# Patient Record
Sex: MALE | Race: WHITE | NOT HISPANIC OR LATINO | URBAN - METROPOLITAN AREA
[De-identification: names, ages, dates, MRNs, and addresses within clinical notes are randomized per-mention and may not be internally consistent; named-entity substitution may affect disease eponyms.]

---

## 2024-06-25 ENCOUNTER — APPOINTMENT (OUTPATIENT)
Dept: PRIMARY CARE | Facility: CLINIC | Age: 34
End: 2024-06-25
Payer: COMMERCIAL

## 2024-07-15 ENCOUNTER — APPOINTMENT (OUTPATIENT)
Dept: PRIMARY CARE | Facility: CLINIC | Age: 34
End: 2024-07-15
Payer: COMMERCIAL

## 2024-07-31 ENCOUNTER — APPOINTMENT (OUTPATIENT)
Dept: PRIMARY CARE | Facility: CLINIC | Age: 34
End: 2024-07-31
Payer: COMMERCIAL

## 2024-07-31 ENCOUNTER — HOSPITAL ENCOUNTER (OUTPATIENT)
Dept: RADIOLOGY | Facility: CLINIC | Age: 34
Discharge: HOME | End: 2024-07-31
Payer: COMMERCIAL

## 2024-07-31 VITALS
OXYGEN SATURATION: 98 % | WEIGHT: 159.3 LBS | SYSTOLIC BLOOD PRESSURE: 140 MMHG | HEART RATE: 81 BPM | TEMPERATURE: 96.9 F | HEIGHT: 69 IN | BODY MASS INDEX: 23.6 KG/M2 | DIASTOLIC BLOOD PRESSURE: 82 MMHG | RESPIRATION RATE: 20 BRPM

## 2024-07-31 DIAGNOSIS — Z82.49 FAMILY HISTORY OF HEART DISEASE: ICD-10-CM

## 2024-07-31 DIAGNOSIS — Z00.00 ANNUAL PHYSICAL EXAM: ICD-10-CM

## 2024-07-31 DIAGNOSIS — Z87.891 HISTORY OF TOBACCO USE: ICD-10-CM

## 2024-07-31 DIAGNOSIS — Z11.3 SCREENING FOR STD (SEXUALLY TRANSMITTED DISEASE): Primary | ICD-10-CM

## 2024-07-31 DIAGNOSIS — K40.90 RIGHT INGUINAL HERNIA: ICD-10-CM

## 2024-07-31 DIAGNOSIS — Z11.59 NEED FOR HEPATITIS C SCREENING TEST: ICD-10-CM

## 2024-07-31 PROCEDURE — 71046 X-RAY EXAM CHEST 2 VIEWS: CPT | Performed by: RADIOLOGY

## 2024-07-31 PROCEDURE — 1036F TOBACCO NON-USER: CPT

## 2024-07-31 PROCEDURE — 71046 X-RAY EXAM CHEST 2 VIEWS: CPT

## 2024-07-31 PROCEDURE — 3008F BODY MASS INDEX DOCD: CPT

## 2024-07-31 PROCEDURE — 99204 OFFICE O/P NEW MOD 45 MIN: CPT

## 2024-07-31 PROCEDURE — 99395 PREV VISIT EST AGE 18-39: CPT

## 2024-07-31 SDOH — ECONOMIC STABILITY: FOOD INSECURITY: WITHIN THE PAST 12 MONTHS, THE FOOD YOU BOUGHT JUST DIDN'T LAST AND YOU DIDN'T HAVE MONEY TO GET MORE.: NEVER TRUE

## 2024-07-31 SDOH — ECONOMIC STABILITY: FOOD INSECURITY: WITHIN THE PAST 12 MONTHS, YOU WORRIED THAT YOUR FOOD WOULD RUN OUT BEFORE YOU GOT MONEY TO BUY MORE.: NEVER TRUE

## 2024-07-31 ASSESSMENT — PATIENT HEALTH QUESTIONNAIRE - PHQ9
1. LITTLE INTEREST OR PLEASURE IN DOING THINGS: NOT AT ALL
SUM OF ALL RESPONSES TO PHQ9 QUESTIONS 1 & 2: 0
2. FEELING DOWN, DEPRESSED OR HOPELESS: NOT AT ALL

## 2024-07-31 ASSESSMENT — PAIN SCALES - GENERAL: PAINLEVEL: 0-NO PAIN

## 2024-07-31 ASSESSMENT — ENCOUNTER SYMPTOMS
OCCASIONAL FEELINGS OF UNSTEADINESS: 0
CARDIOVASCULAR NEGATIVE: 1
ARTHRALGIAS: 1
RESPIRATORY NEGATIVE: 1
CONSTITUTIONAL NEGATIVE: 1
EYES NEGATIVE: 1
LOSS OF SENSATION IN FEET: 0
GASTROINTESTINAL NEGATIVE: 1
DEPRESSION: 0
ENDOCRINE NEGATIVE: 1

## 2024-07-31 ASSESSMENT — ANXIETY QUESTIONNAIRES
4. TROUBLE RELAXING: NOT AT ALL
2. NOT BEING ABLE TO STOP OR CONTROL WORRYING: NOT AT ALL
6. BECOMING EASILY ANNOYED OR IRRITABLE: NOT AT ALL
3. WORRYING TOO MUCH ABOUT DIFFERENT THINGS: NOT AT ALL
7. FEELING AFRAID AS IF SOMETHING AWFUL MIGHT HAPPEN: NOT AT ALL
1. FEELING NERVOUS, ANXIOUS, OR ON EDGE: NOT AT ALL
IF YOU CHECKED OFF ANY PROBLEMS ON THIS QUESTIONNAIRE, HOW DIFFICULT HAVE THESE PROBLEMS MADE IT FOR YOU TO DO YOUR WORK, TAKE CARE OF THINGS AT HOME, OR GET ALONG WITH OTHER PEOPLE: NOT DIFFICULT AT ALL
5. BEING SO RESTLESS THAT IT IS HARD TO SIT STILL: NOT AT ALL
GAD7 TOTAL SCORE: 0

## 2024-07-31 ASSESSMENT — LIFESTYLE VARIABLES
AUDIT-C TOTAL SCORE: 0
SKIP TO QUESTIONS 9-10: 1
HOW OFTEN DO YOU HAVE SIX OR MORE DRINKS ON ONE OCCASION: NEVER
HOW MANY STANDARD DRINKS CONTAINING ALCOHOL DO YOU HAVE ON A TYPICAL DAY: PATIENT DOES NOT DRINK
HOW OFTEN DO YOU HAVE A DRINK CONTAINING ALCOHOL: NEVER

## 2024-07-31 NOTE — PROGRESS NOTES
"Subjective   Patient ID: Med Mireles is a 34 y.o. male who presents for New Patient Visit (Headaches - since he was 6 - gets them everyday - OTC Tylenol, advil/Heart - mother has heart valve replacement and has heart issues - pt does feel like he has heart palpations, causing nausea , lightheadedness.  Stopped smoking and lightheadedness and paplipatations  has gotten better - 6 months - no cardiologist/Hernia - right side lower abdomen - causes some pain (3 on pain scale) - 1 to 2 months ).    HPI   Med presents to establish care and for an annual visit. He endorses that he was experiencing heart palpitations that caused him nausea and lightheadedness, but those episodes have stopped since he quit smoking tobacco 6 months ago. His mother does have a cardiac history and he would like referral to cardiology to become established. Chest x-ray also ordered.    He has a right inguinal hernia that he noticed about a month ago. It is not causing him severe pain, but he would like to see general surgery to monitor.    He endorses having left elbow pain- he does not recall any direct trauma to the area, but he did work as a  for 9 years and felt that it may be due to wear and tear on his body from his job. He would like to try a compression sleeve for now, he declined x-ray at this time.     Routine lab work ordered, follow up in one year.    Review of Systems   Constitutional: Negative.    HENT: Negative.     Eyes: Negative.    Respiratory: Negative.     Cardiovascular: Negative.    Gastrointestinal: Negative.    Endocrine: Negative.    Genitourinary: Negative.    Musculoskeletal:  Positive for arthralgias.       Objective   /82 (BP Location: Left arm, Patient Position: Sitting, BP Cuff Size: Adult)   Pulse 81   Temp 36.1 °C (96.9 °F)   Resp 20   Ht 1.753 m (5' 9\")   Wt 72.3 kg (159 lb 4.8 oz)   SpO2 98%   BMI 23.52 kg/m²     Physical Exam  HENT:      Right Ear: Tympanic membrane normal.      Left " Ear: Tympanic membrane normal.   Cardiovascular:      Rate and Rhythm: Normal rate and regular rhythm.      Pulses: Normal pulses.      Heart sounds: Normal heart sounds.   Pulmonary:      Effort: Pulmonary effort is normal.      Breath sounds: Normal breath sounds.   Musculoskeletal:         General: Tenderness present. Normal range of motion.      Left elbow: Tenderness present.      Comments: Tenderness at the elbow area when carrying heavy objects at work. ROM WNL   Skin:     General: Skin is warm and dry.      Capillary Refill: Capillary refill takes less than 2 seconds.   Neurological:      Mental Status: He is oriented to person, place, and time.   Psychiatric:         Mood and Affect: Mood normal.         Behavior: Behavior normal.         Thought Content: Thought content normal.         Judgment: Judgment normal.         Assessment/Plan   Problem List Items Addressed This Visit             ICD-10-CM    Annual physical exam Z00.00    Relevant Orders    CBC    Comprehensive Metabolic Panel    TSH with reflex to Free T4 if abnormal    Lipid Panel    Vitamin B6    Vitamin B12    Right inguinal hernia K40.90    Relevant Orders    Referral to General Surgery    History of tobacco use Z87.891    Relevant Orders    XR chest 2 views    Family history of heart disease Z82.49    Relevant Orders    Referral to Cardiology     Other Visit Diagnoses         Codes    Screening for STD (sexually transmitted disease)    -  Primary Z11.3    Relevant Orders    HIV 1/2 Antigen/Antibody Screen with Reflex to Confirmation    Need for hepatitis C screening test     Z11.59    Relevant Orders    Hepatitis C Antibody

## 2024-07-31 NOTE — PATIENT INSTRUCTIONS
Thank you for coming to see me today.  If you have any questions or concerns following our visit, please contact the office.  Phone: [phone]    Follow up with me in 1 year    1)  Get fasting labwork completed.  The lab is down the lua from our office, I will reach out with results

## 2024-08-07 ENCOUNTER — APPOINTMENT (OUTPATIENT)
Dept: SURGERY | Facility: CLINIC | Age: 34
End: 2024-08-07
Payer: COMMERCIAL

## 2024-08-07 VITALS
WEIGHT: 158.4 LBS | BODY MASS INDEX: 23.46 KG/M2 | OXYGEN SATURATION: 97 % | HEIGHT: 69 IN | HEART RATE: 71 BPM | SYSTOLIC BLOOD PRESSURE: 115 MMHG | DIASTOLIC BLOOD PRESSURE: 73 MMHG

## 2024-08-07 DIAGNOSIS — K40.90 INGUINAL HERNIA OF RIGHT SIDE WITHOUT OBSTRUCTION OR GANGRENE: Primary | ICD-10-CM

## 2024-08-07 PROCEDURE — 3008F BODY MASS INDEX DOCD: CPT | Performed by: SURGERY

## 2024-08-07 PROCEDURE — 99203 OFFICE O/P NEW LOW 30 MIN: CPT | Performed by: SURGERY

## 2024-08-07 PROCEDURE — 1036F TOBACCO NON-USER: CPT | Performed by: SURGERY

## 2024-08-07 ASSESSMENT — ENCOUNTER SYMPTOMS
WOUND: 0
MYALGIAS: 0
CONSTIPATION: 0
COUGH: 0
WEAKNESS: 0
SHORTNESS OF BREATH: 0
EYE PAIN: 0
POLYPHAGIA: 0
VOMITING: 0
DIARRHEA: 0
DYSURIA: 0
BRUISES/BLEEDS EASILY: 0
FEVER: 0
FLANK PAIN: 0
NAUSEA: 0
EYE REDNESS: 0
AGITATION: 0
CONFUSION: 0
HEMATURIA: 0
CHILLS: 0
SPEECH DIFFICULTY: 0
FATIGUE: 0
HEADACHES: 0
ARTHRALGIAS: 1
ABDOMINAL PAIN: 0
FREQUENCY: 0

## 2024-08-07 NOTE — PATIENT INSTRUCTIONS
1.  Activity as tolerated.  Avoid any activity that causes pain at your hernia site.  2.  If you do have increased pain at your hernia site, lie down and apply ice to the groin area.  You may also do gentle massage to the right groin area.  If your pain does not improve within 1 hour, either go to the emergency room or call Dr. Whitman's office.  [phone]  3.  When you are ready to schedule surgery for your hernia, please call Dr. Whitman's office.

## 2024-08-07 NOTE — PROGRESS NOTES
GENERAL SURGERY OFFICE NOTE    Patient: Med Mireles    Age: 34 y.o.   Gender: male    MRN: [id number]    PCP: ONELIA Young        SUBJECTIVE     Chief Complaint  New Patient Visit (Patient was referred by Tiffanie Mendoza CNP for right inguinal hernia. Patient states that he was doing some heavy lifting that caused it. )       ULISSES Jovel is a 34-year-old white male who I am seeing in consultation at the request of his primary care physician for evaluation of a right inguinal hernia.  He states he does a moderate amount of heavy lifting (80 pounds at a time) at work repetitively.  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  Although he does not remember a sudden onset of any pain, about 1 to 2 weeks afterwards, he noticed a small bump in his right groin area.  This has gradually increased in size over the subsequent weeks.  The bulge is intermittent.  It has gotten as large as a golf ball.  He denies any changes in bowel or bladder habits.  No obstructive symptoms.    ROS  Review of Systems   Constitutional:  Negative for chills, fatigue and fever.   HENT:  Negative for congestion, ear pain and hearing loss.    Eyes:  Negative for pain and redness.   Respiratory:  Negative for cough and shortness of breath.    Cardiovascular:  Negative for chest pain and leg swelling.   Gastrointestinal:  Negative for abdominal pain, constipation, diarrhea, nausea and vomiting.   Endocrine: Negative for polyphagia.   Genitourinary:  Negative for dysuria, flank pain, frequency and hematuria.   Musculoskeletal:  Positive for arthralgias. Negative for myalgias.   Skin:  Negative for rash and wound.   Allergic/Immunologic: Negative for immunocompromised state.   Neurological:  Negative for speech difficulty, weakness and headaches.   Hematological:  Does not bruise/bleed easily.   Psychiatric/Behavioral:  Negative for agitation and confusion.   "         HISTORY   No past medical history on file.     No past surgical history on file.     Family History   Problem Relation Name Age of Onset    Heart disease Mother          Allergies   Allergen Reactions    Aspirin Unknown        Social History     Tobacco Use   Smoking Status Former    Types: Cigarettes    Passive exposure: Past   Smokeless Tobacco Never        Social History     Substance and Sexual Activity   Alcohol Use Yes    Comment: rare        HOME MEDICATIONS  No current outpatient medications       OBJECTIVE   Last Recorded Vitals.  Blood pressure 115/73, pulse 71, height 1.753 m (5' 9\"), weight 71.8 kg (158 lb 6.4 oz), SpO2 97%.     PHYSICAL EXAM  Physical Exam   General: Well-developed, well-nourished and in no acute distress.  Thin body habitus.  Head: Normocephalic. Atraumatic.  Neck/thyroid: Neck is supple.   Eyes: Pupils equal round and reactive to light. Conjunctiva normal.  ENMT: No masses or deformity of external nose. External ears without masses.  Respiratory/Chest:  Normal respiratory effort.  Cardiovascular: Regular rate and rhythm.   Abdomen: Soft, nontender, nondistended. No hernias.   Rectal: Deferred  : Normal external genitalia.  With Valsalva, there is a small right inguinal hernia which is reducible and nontender.  No obvious hernia on the left.  Musculoskeletal: Joints and limbs are grossly normal. Normal gait. Normal range of motion of major joints.  Neuro: Oriented to person, place and time. No obvious neurological deficit. Motor strength grossly normal.  Psych: Normal mood and affect.    RESULTS   Labs  No results found for this or any previous visit (from the past 24 hour(s)).    Radiology Resutls  No results found.       ASSESSMENT / PLAN   Diagnoses and all orders for this visit:  Inguinal hernia of right side without obstruction or gangrene  -     Referral to General Surgery      Plan  1.  The benefits and risks of a laparoscopic right inguinal hernia repair with mesh " surgery have been discussed with the patient.  The nature of the procedure was reviewed with the patient which included the risk and benefits of having surgery.  Alternative treatment options were reviewed. The risk included, but not limited to, infection, bleeding, injury surrounding organs, possible need for open procedure, possible need for another procedure, hernia formation at incision sites, recurrent hernias in the inguinal area, allergic reaction to medication and death.  If a hernia is identified on the left at the time of surgery, laparoscopic repair of a left inguinal hernia will also be performed.  2.  Education material regarding inguinal hernias has been provided to the patient.  3.  Signs and symptoms of incarceration have been reviewed. If any of these symptoms develop, the patient was instructed to contact the office immediately.  4.  Since he does a lot of heavy lifting (80 pounds) repetitively at work, he will need a total of 4 weeks of light duty postoperatively before returning back to full duty to decrease his risk of recurrent hernia, as well as, reduce his risk of hernia formation at his incision sites.  5.  Before he schedule surgery, he noted that he is undergoing a cardiac workup and workup for his elbow symptoms.  When he is ready to schedule surgery, he will call the office back.      Elvi Whitman MD, FACS   [facility]  [address] [place] [address];   [address] [address]  [place]  [address] [phone]

## 2024-08-07 NOTE — LETTER
August 7, 2024     NOELIA Young  [address]  [facility], [facility] [address]  [facility] [address]    Patient: eMd Mireles   YOB: 1990   Date of Visit: 8/7/2024       Dear ONELIA Carpenter:    Thank you for referring Med Mireles to me for evaluation. Below are my notes for this consultation.  If you have questions, please do not hesitate to call me. I look forward to following your patient along with you.       Sincerely,     Elvi Whitman MD      CC: No Recipients  ______________________________________________________________________________________        GENERAL SURGERY OFFICE NOTE    Patient: Med Mireles    Age: 34 y.o.   Gender: male    MRN: [id number]    PCP: ONELIA Young        SUBJECTIVE     Chief Complaint  New Patient Visit (Patient was referred by Tiffanie Mendoza CNP for right inguinal hernia. Patient states that he was doing some heavy lifting that caused it. )       ULISSES Jovel is a 34-year-old white male who I am seeing in consultation at the request of his primary care physician for evaluation of a right inguinal hernia.  He states he does a moderate amount of heavy lifting (80 pounds at a time) at work repetitively.  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  Although he does not remember a sudden onset of any pain, about 1 to 2 weeks afterwards, he noticed a small bump in his right groin area.  This has gradually increased in size over the subsequent weeks.  The bulge is intermittent.  It has gotten as large as a golf ball.  He denies any changes in bowel or bladder habits.  No obstructive symptoms.    ROS  Review of Systems   Constitutional:  Negative for chills, fatigue and fever.   HENT:  Negative for congestion, ear pain and hearing loss.    Eyes:  Negative for pain and redness.   Respiratory:  Negative for cough and shortness of breath.   "  Cardiovascular:  Negative for chest pain and leg swelling.   Gastrointestinal:  Negative for abdominal pain, constipation, diarrhea, nausea and vomiting.   Endocrine: Negative for polyphagia.   Genitourinary:  Negative for dysuria, flank pain, frequency and hematuria.   Musculoskeletal:  Positive for arthralgias. Negative for myalgias.   Skin:  Negative for rash and wound.   Allergic/Immunologic: Negative for immunocompromised state.   Neurological:  Negative for speech difficulty, weakness and headaches.   Hematological:  Does not bruise/bleed easily.   Psychiatric/Behavioral:  Negative for agitation and confusion.           HISTORY   No past medical history on file.     No past surgical history on file.     Family History   Problem Relation Name Age of Onset   • Heart disease Mother          Allergies   Allergen Reactions   • Aspirin Unknown        Social History     Tobacco Use   Smoking Status Former   • Types: Cigarettes   • Passive exposure: Past   Smokeless Tobacco Never        Social History     Substance and Sexual Activity   Alcohol Use Yes    Comment: rare        HOME MEDICATIONS  No current outpatient medications       OBJECTIVE   Last Recorded Vitals.  Blood pressure 115/73, pulse 71, height 1.753 m (5' 9\"), weight 71.8 kg (158 lb 6.4 oz), SpO2 97%.     PHYSICAL EXAM  Physical Exam   General: Well-developed, well-nourished and in no acute distress.  Thin body habitus.  Head: Normocephalic. Atraumatic.  Neck/thyroid: Neck is supple.   Eyes: Pupils equal round and reactive to light. Conjunctiva normal.  ENMT: No masses or deformity of external nose. External ears without masses.  Respiratory/Chest:  Normal respiratory effort.  Cardiovascular: Regular rate and rhythm.   Abdomen: Soft, nontender, nondistended. No hernias.   Rectal: Deferred  : Normal external genitalia.  With Valsalva, there is a small right inguinal hernia which is reducible and nontender.  No obvious hernia on the " left.  Musculoskeletal: Joints and limbs are grossly normal. Normal gait. Normal range of motion of major joints.  Neuro: Oriented to person, place and time. No obvious neurological deficit. Motor strength grossly normal.  Psych: Normal mood and affect.    RESULTS   Labs  No results found for this or any previous visit (from the past 24 hour(s)).    Radiology Resutls  No results found.       ASSESSMENT / PLAN   Diagnoses and all orders for this visit:  Inguinal hernia of right side without obstruction or gangrene  -     Referral to General Surgery      Plan  1.  The benefits and risks of a laparoscopic right inguinal hernia repair with mesh surgery have been discussed with the patient.  The nature of the procedure was reviewed with the patient which included the risk and benefits of having surgery.  Alternative treatment options were reviewed. The risk included, but not limited to, infection, bleeding, injury surrounding organs, possible need for open procedure, possible need for another procedure, hernia formation at incision sites, recurrent hernias in the inguinal area, allergic reaction to medication and death.  If a hernia is identified on the left at the time of surgery, laparoscopic repair of a left inguinal hernia will also be performed.  2.  Education material regarding inguinal hernias has been provided to the patient.  3.  Signs and symptoms of incarceration have been reviewed. If any of these symptoms develop, the patient was instructed to contact the office immediately.  4.  Since he does a lot of heavy lifting (80 pounds) repetitively at work, he will need a total of 4 weeks of light duty postoperatively before returning back to full duty to decrease his risk of recurrent hernia, as well as, reduce his risk of hernia formation at his incision sites.  5.  Before he schedule surgery, he noted that he is undergoing a cardiac workup and workup for his elbow symptoms.  When he is ready to schedule surgery,  he will call the office back.      Elvi Whitman MD, FACS  [facility]  [address] [place] [address];   [address]  [place]  [address] [phone]

## 2024-08-14 ENCOUNTER — TELEPHONE (OUTPATIENT)
Dept: SURGERY | Facility: CLINIC | Age: 34
End: 2024-08-14
Payer: COMMERCIAL

## 2024-08-15 ENCOUNTER — HOSPITAL ENCOUNTER (OUTPATIENT)
Facility: HOSPITAL | Age: 34
Setting detail: OUTPATIENT SURGERY
End: 2024-08-15
Attending: SURGERY | Admitting: SURGERY
Payer: COMMERCIAL

## 2024-08-15 DIAGNOSIS — K40.90 INGUINAL HERNIA OF RIGHT SIDE WITHOUT OBSTRUCTION OR GANGRENE: Primary | ICD-10-CM

## 2024-09-10 ENCOUNTER — ANESTHESIA EVENT (OUTPATIENT)
Dept: OPERATING ROOM | Facility: HOSPITAL | Age: 34
End: 2024-09-10

## 2024-09-10 ENCOUNTER — CLINICAL SUPPORT (OUTPATIENT)
Dept: PREADMISSION TESTING | Facility: HOSPITAL | Age: 34
End: 2024-09-10
Payer: COMMERCIAL

## 2024-09-10 RX ORDER — FAMOTIDINE 10 MG/ML
20 INJECTION INTRAVENOUS ONCE
OUTPATIENT
Start: 2024-09-10 | End: 2024-09-10

## 2024-09-10 RX ORDER — ACETAMINOPHEN 325 MG/1
975 TABLET ORAL ONCE
OUTPATIENT
Start: 2024-09-12

## 2024-09-10 RX ORDER — HYDROMORPHONE HYDROCHLORIDE 0.2 MG/ML
0.2 INJECTION INTRAMUSCULAR; INTRAVENOUS; SUBCUTANEOUS EVERY 5 MIN PRN
OUTPATIENT
Start: 2024-09-10

## 2024-09-10 RX ORDER — SODIUM CHLORIDE 9 MG/ML
50 INJECTION, SOLUTION INTRAVENOUS CONTINUOUS
OUTPATIENT
Start: 2024-09-12

## 2024-09-10 RX ORDER — ONDANSETRON HYDROCHLORIDE 2 MG/ML
4 INJECTION, SOLUTION INTRAVENOUS ONCE
OUTPATIENT
Start: 2024-09-10 | End: 2024-09-10

## 2024-09-10 RX ORDER — ONDANSETRON HYDROCHLORIDE 2 MG/ML
4 INJECTION, SOLUTION INTRAVENOUS ONCE AS NEEDED
OUTPATIENT
Start: 2024-09-10

## 2024-09-10 RX ORDER — SODIUM CITRATE AND CITRIC ACID MONOHYDRATE 334; 500 MG/5ML; MG/5ML
30 SOLUTION ORAL ONCE
OUTPATIENT
Start: 2024-09-10 | End: 2024-09-10

## 2024-09-10 RX ORDER — ALBUTEROL SULFATE 0.83 MG/ML
2.5 SOLUTION RESPIRATORY (INHALATION) ONCE
OUTPATIENT
Start: 2024-09-10 | End: 2024-09-10

## 2024-09-10 RX ORDER — METOCLOPRAMIDE HYDROCHLORIDE 5 MG/ML
10 INJECTION INTRAMUSCULAR; INTRAVENOUS ONCE
OUTPATIENT
Start: 2024-09-10 | End: 2024-09-10

## 2024-09-10 NOTE — PREPROCEDURE INSTRUCTIONS
No outpatient medications have been marked as taking for the 9/10/24 encounter (Clinical Support) with [facility] PAT ROOM 02.                       NPO Instructions:    NOTHING TO EAT OR DRINK AFTER MIDNIGHT  HYDRATE WELL THE DAY BEFORE    Additional Instructions:     Wear  comfortable loose fitting clothing  Do not use moisturizers, creams, lotions or perfume  All jewelry and valuables should be left at home    HIBICLENS SHOWER MORNING OF and  night before, below neck line and away from private area  Do not shave your groin area   Call [phone] with any questions

## 2024-09-11 ENCOUNTER — TELEPHONE (OUTPATIENT)
Dept: SURGERY | Facility: CLINIC | Age: 34
End: 2024-09-11
Payer: COMMERCIAL

## 2024-09-11 DIAGNOSIS — R00.2 HEART PALPITATIONS: Primary | ICD-10-CM

## 2024-09-11 PROBLEM — G44.209 TENSION TYPE HEADACHE: Status: ACTIVE | Noted: 2022-03-10

## 2024-09-11 PROBLEM — K58.9 IRRITABLE BOWEL SYNDROME: Status: ACTIVE | Noted: 2022-03-10

## 2024-09-11 PROBLEM — G43.019 COMMON MIGRAINE WITH INTRACTABLE MIGRAINE: Status: ACTIVE | Noted: 2024-09-11

## 2024-09-11 NOTE — TELEPHONE ENCOUNTER
Patient is having surgery tomorrow, 09/12/24 laparoscopic right inguinal hernia repair with mesh and is concerned about current symptoms he has been having.     Patient state that he has a sore throat, a cough a that has slight mucus, no fever, and no other concerning symptoms. Patient states he is coughing up yellow and green mucus. Patient would like to make sure this does not interfere with surgery tomorrow. Please advise.

## 2024-09-12 ENCOUNTER — ANESTHESIA (OUTPATIENT)
Dept: OPERATING ROOM | Facility: HOSPITAL | Age: 34
End: 2024-09-12

## 2024-09-16 ENCOUNTER — TELEPHONE (OUTPATIENT)
Dept: CARDIOLOGY | Facility: HOSPITAL | Age: 34
End: 2024-09-16
Payer: COMMERCIAL

## 2024-09-17 ENCOUNTER — APPOINTMENT (OUTPATIENT)
Dept: CARDIOLOGY | Facility: CLINIC | Age: 34
End: 2024-09-17
Payer: COMMERCIAL

## 2024-09-17 ENCOUNTER — ANCILLARY PROCEDURE (OUTPATIENT)
Dept: CARDIOLOGY | Facility: HOSPITAL | Age: 34
End: 2024-09-17
Payer: COMMERCIAL

## 2024-09-17 ENCOUNTER — LAB (OUTPATIENT)
Dept: LAB | Facility: LAB | Age: 34
End: 2024-09-17
Payer: COMMERCIAL

## 2024-09-17 VITALS
WEIGHT: 160 LBS | BODY MASS INDEX: 23.7 KG/M2 | OXYGEN SATURATION: 100 % | HEART RATE: 90 BPM | DIASTOLIC BLOOD PRESSURE: 80 MMHG | SYSTOLIC BLOOD PRESSURE: 134 MMHG | HEIGHT: 69 IN

## 2024-09-17 DIAGNOSIS — Z82.49 FAMILY HISTORY OF HEART DISEASE: ICD-10-CM

## 2024-09-17 DIAGNOSIS — R00.2 HEART PALPITATIONS: ICD-10-CM

## 2024-09-17 DIAGNOSIS — Z01.818 PRE-OPERATIVE CLEARANCE: Primary | ICD-10-CM

## 2024-09-17 LAB
ANION GAP SERPL CALC-SCNC: 11 MMOL/L (ref 10–20)
BODY SURFACE AREA: 1.88 M2
BUN SERPL-MCNC: 14 MG/DL (ref 6–23)
CALCIUM SERPL-MCNC: 9.2 MG/DL (ref 8.6–10.3)
CHLORIDE SERPL-SCNC: 103 MMOL/L (ref 98–107)
CHOLEST SERPL-MCNC: 192 MG/DL (ref 0–199)
CHOLESTEROL/HDL RATIO: 2.9
CO2 SERPL-SCNC: 29 MMOL/L (ref 21–32)
CREAT SERPL-MCNC: 0.92 MG/DL (ref 0.5–1.3)
EGFRCR SERPLBLD CKD-EPI 2021: >90 ML/MIN/1.73M*2
ERYTHROCYTE [DISTWIDTH] IN BLOOD BY AUTOMATED COUNT: 12.8 % (ref 11.5–14.5)
GLUCOSE SERPL-MCNC: 90 MG/DL (ref 74–99)
HCT VFR BLD AUTO: 44.1 % (ref 41–52)
HDLC SERPL-MCNC: 67.2 MG/DL
HGB BLD-MCNC: 15.4 G/DL (ref 13.5–17.5)
LDLC SERPL CALC-MCNC: 105 MG/DL
MCH RBC QN AUTO: 30.9 PG (ref 26–34)
MCHC RBC AUTO-ENTMCNC: 34.9 G/DL (ref 32–36)
MCV RBC AUTO: 88 FL (ref 80–100)
NON HDL CHOLESTEROL: 125 MG/DL (ref 0–149)
NRBC BLD-RTO: 0 /100 WBCS (ref 0–0)
PLATELET # BLD AUTO: 276 X10*3/UL (ref 150–450)
POTASSIUM SERPL-SCNC: 4.8 MMOL/L (ref 3.5–5.3)
RBC # BLD AUTO: 4.99 X10*6/UL (ref 4.5–5.9)
SODIUM SERPL-SCNC: 138 MMOL/L (ref 136–145)
TRIGL SERPL-MCNC: 100 MG/DL (ref 0–149)
VLDL: 20 MG/DL (ref 0–40)
WBC # BLD AUTO: 8.3 X10*3/UL (ref 4.4–11.3)

## 2024-09-17 PROCEDURE — 3008F BODY MASS INDEX DOCD: CPT | Performed by: STUDENT IN AN ORGANIZED HEALTH CARE EDUCATION/TRAINING PROGRAM

## 2024-09-17 PROCEDURE — 80061 LIPID PANEL: CPT

## 2024-09-17 PROCEDURE — 36415 COLL VENOUS BLD VENIPUNCTURE: CPT

## 2024-09-17 PROCEDURE — 80048 BASIC METABOLIC PNL TOTAL CA: CPT

## 2024-09-17 PROCEDURE — 85027 COMPLETE CBC AUTOMATED: CPT

## 2024-09-17 PROCEDURE — 93242 EXT ECG>48HR<7D RECORDING: CPT

## 2024-09-17 PROCEDURE — 99204 OFFICE O/P NEW MOD 45 MIN: CPT | Performed by: STUDENT IN AN ORGANIZED HEALTH CARE EDUCATION/TRAINING PROGRAM

## 2024-09-17 PROCEDURE — 93000 ELECTROCARDIOGRAM COMPLETE: CPT | Performed by: STUDENT IN AN ORGANIZED HEALTH CARE EDUCATION/TRAINING PROGRAM

## 2024-09-17 PROCEDURE — 1036F TOBACCO NON-USER: CPT | Performed by: STUDENT IN AN ORGANIZED HEALTH CARE EDUCATION/TRAINING PROGRAM

## 2024-09-17 NOTE — PROGRESS NOTES
[facility] Clinic Note    Date: 09/17/24  Time: 4:33 PM    Subjective   Med Mireles is a 34 y.o. male who is coming to cardiology clinic to establish care.  Patient does not see a doctor regularly.  Patient is a lifetime smoker.  Patient is very active at baseline.  Patient was noted to have a hernia in the inguinal region and being planned for surgical evaluation.  During preoperative workup patient treatment some heaviness/palpitation in the chest and was recommended cardiology evaluation.  Patient denies any chest pain at baseline.  He is very active physically without limitation.  He used to smoke a lot but now have stopped smoking.  He reports that he feels palpitation, fluttering sensation with occasional lightheadedness and dizziness.  He denies any syncope.  He denies any chest pain or dyspnea at this time.          Review of Systems:  Otherwise, limited cardiovascular review of systems is negative.        Medical History:   He has a past medical history of Palpitations.  Surgical History:   History reviewed. No pertinent surgical history.PSHP@  Social History:   Social Determinants of Health with Concerns     Tobacco Use: Medium Risk (9/17/2024)    Patient History     Smoking Tobacco Use: Former     Smokeless Tobacco Use: Never     Passive Exposure: Past   Financial Resource Strain: Not on file   Transportation Needs: Not on file   Physical Activity: Not on file   Stress: Not on file   Social Connections: Not on file   Intimate Partner Violence: Not on file   Housing Stability: Not on file   Utilities: Not on file   Digital Equity: Not on file   Health Literacy: Not on file     Family History:   Family History   Problem Relation Name Age of Onset    Heart disease Mother        Allergies:  Aspirin    Outpatient Medications:  No current outpatient medications    Objective     Physical Exam  Vitals:    09/17/24 1253   BP: 134/80   BP Location: Right arm   Patient Position:  "Sitting   BP Cuff Size: Adult   Pulse: 90   SpO2: 100%   Weight: 72.6 kg (160 lb)   Height: 1.753 m (5' 9\")     Wt Readings from Last 3 Encounters:   09/17/24 72.6 kg (160 lb)   08/07/24 71.8 kg (158 lb 6.4 oz)   07/31/24 72.3 kg (159 lb 4.8 oz)       General: Alert and Oriented, No distress, cooperative  Head: Normocephalic without obvious abnormality, atraumatic  Eyes: Conjunctiva/corneas clear, EOM's grossly intact  Neck: Supple, trachea midline, No thyroid enlargement/tenderness/nodules; No JVD  Lungs: Clear to auscultation bilaterally, no wheezes, rhonci, or rales. respirations unlabored  Chest Wall: No tenderness or deformity  Heart: Regular rhythm, normal S1/S2, no murmur  Abdomen: Soft, non-tender, Non-distended, bowel sounds active  Extremities: No edema, no cyanosis, no clubbing  Skin: Skin color, texture, turgor normal.  No rashes or lesions noted  Neurologic: Alert and oriented x 3, grossly moving all extremities, speech intact        I have personally reviewed the following images and laboratory findings:  ECG: NSR with sinus arrhythmia   Echocardiogram: not done  Laboratory values:   No visits with results within 2 Month(s) from this visit.   Latest known visit with results is:   No results found for any previous visit.     CBC -  Lab Results   Component Value Date    WBC 8.3 09/17/2024    HGB 15.4 09/17/2024    HCT 44.1 09/17/2024    MCV 88 09/17/2024     09/17/2024       CMP -  No results found for: \"CALCIUM\", \"PHOS\", \"PROT\", \"ALBUMIN\", \"AST\", \"ALT\", \"ALKPHOS\", \"BILITOT\"    LIPID PANEL -   No results found for: \"CHOL\", \"HDL\", \"CHHDL\", \"LDL\", \"VLDL\", \"TRIG\", \"NHDL\"    RENAL FUNCTION PANEL -   No results found for: \"GLU\", \"K\", \"CHLOR\", \"PHOS\"    No results found for: \"BNP\", \"HGBA1C\"     Assessment/Plan   Palpitation  Family history of heart disease  Preoperative risk stratification    Plan:  -I will order basic lab with CBC, BMP, and lipid panel.  -I will order Holter monitor to rule out " malignant arrhythmia.  -I will order transthoracic echo to rule out structural normalities.  -Patient is otherwise very active and plan for low risk procedure.  He is able to do more than 4 METS.  Based on RCRI risk calculator patient is at 3.9 %  30-day risk of death, MI, or cardiac arrest for planned procedure.  If if echo and Holter are unremarkable then patient can proceed with planned procedure at above risk.    RTC as scheduled.    In addition, the following orders were placed today:  Orders Placed This Encounter   Procedures    Lipid panel    CBC    Basic metabolic panel    Holter Or Event Cardiac Monitor    ECG 12 Lead    Transthoracic Echo (TTE) Complete                 SIGNATURE: Scooter Malave MD PATIENT NAME: Med Mireles   DATE/TIME: September 17, 2024 4:33 PM MRN: [id number]

## 2024-09-19 ENCOUNTER — TELEPHONE (OUTPATIENT)
Dept: CARDIOLOGY | Facility: HOSPITAL | Age: 34
End: 2024-09-19
Payer: COMMERCIAL

## 2024-09-19 NOTE — TELEPHONE ENCOUNTER
9/19/24  1035  Called lipid panel results to patient and encouraged exercise and reducing his carbohydrates. Patient verbalized understanding of results and is agreeable to plan.          ----- Message from Scooter Malave sent at 9/18/2024  5:40 PM EDT -----  LDL slightly elevated.  Encourage exercise and low-carb diet.  ----- Message -----  From: Lab, Background User  Sent: 9/17/2024   4:17 PM EDT  To: Scooter Malave MD

## 2024-09-25 ENCOUNTER — APPOINTMENT (OUTPATIENT)
Dept: CARDIOLOGY | Facility: HOSPITAL | Age: 34
End: 2024-09-25
Payer: COMMERCIAL

## 2024-09-30 ENCOUNTER — APPOINTMENT (OUTPATIENT)
Dept: CARDIOLOGY | Facility: CLINIC | Age: 34
End: 2024-09-30
Payer: COMMERCIAL

## 2024-10-07 LAB — BODY SURFACE AREA: 1.88 M2

## 2024-10-09 ENCOUNTER — TELEPHONE (OUTPATIENT)
Dept: CARDIOLOGY | Facility: HOSPITAL | Age: 34
End: 2024-10-09
Payer: COMMERCIAL

## 2024-10-09 NOTE — TELEPHONE ENCOUNTER
10/9/24  1219  Called results and follow up directive to patient with patient verbalizing understanding.      ----- Message from Scooter Malave sent at 10/8/2024  3:45 PM EDT -----  Routine, nothing major  ----- Message -----  From: Wes Gaviria DO  Sent: 10/7/2024   5:43 PM EDT  To: Scooter Malave MD

## 2024-10-10 ENCOUNTER — TELEPHONE (OUTPATIENT)
Dept: CARDIOLOGY | Facility: HOSPITAL | Age: 34
End: 2024-10-10

## 2024-10-10 ENCOUNTER — HOSPITAL ENCOUNTER (OUTPATIENT)
Dept: CARDIOLOGY | Facility: HOSPITAL | Age: 34
Discharge: HOME | End: 2024-10-10
Payer: COMMERCIAL

## 2024-10-10 DIAGNOSIS — Z82.49 FAMILY HISTORY OF HEART DISEASE: ICD-10-CM

## 2024-10-10 DIAGNOSIS — R00.2 HEART PALPITATIONS: ICD-10-CM

## 2024-10-10 LAB
AORTIC VALVE MEAN GRADIENT: 3.7 MMHG
AORTIC VALVE PEAK VELOCITY: 1.28 M/S
AV PEAK GRADIENT: 6.6 MMHG
AVA (PEAK VEL): 2.97 CM2
AVA (VTI): 2.75 CM2
EJECTION FRACTION: 58 %
LEFT ATRIUM VOLUME AREA LENGTH INDEX BSA: 21.4 ML/M2
LEFT VENTRICLE INTERNAL DIMENSION DIASTOLE: 4.95 CM (ref 3.5–6)
LEFT VENTRICULAR OUTFLOW TRACT DIAMETER: 2.14 CM
MITRAL VALVE E/A RATIO: 1.43
MITRAL VALVE E/E' RATIO: 5.53
RIGHT VENTRICLE FREE WALL PEAK S': 16.97 CM/S
TRICUSPID ANNULAR PLANE SYSTOLIC EXCURSION: 2.2 CM

## 2024-10-10 PROCEDURE — 93306 TTE W/DOPPLER COMPLETE: CPT

## 2024-10-10 PROCEDURE — 93306 TTE W/DOPPLER COMPLETE: CPT | Performed by: INTERNAL MEDICINE

## 2024-10-10 NOTE — TELEPHONE ENCOUNTER
10/10/24  1651  Returned call to patient and gave him results for echocardiogram and his Holter monitor results.    Patient verbalized understanding; reported he has had some some bouts of dizziness with nausea and has an upcoming hernia surgery and wants to make sure things are okay.        Hello, I read my echo results and wanted to ask about the mitral valve regurgitation and tricuspid valve regurgitation findings.      Thank you

## 2024-10-10 NOTE — TELEPHONE ENCOUNTER
10/10/24  1522  Called patient to discuss echo results question on regurgitation; no answer. Left voice message for patient to return call.

## 2024-10-14 ENCOUNTER — TELEPHONE (OUTPATIENT)
Dept: CARDIOLOGY | Facility: HOSPITAL | Age: 34
End: 2024-10-14

## 2024-10-14 NOTE — TELEPHONE ENCOUNTER
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  Reviewed the note that the echo results were reviewed with the patient, but no documentation of whether, or not, he has been cleared from a cardiac standpoint.

## 2024-10-15 DIAGNOSIS — K40.90 INGUINAL HERNIA OF RIGHT SIDE WITHOUT OBSTRUCTION OR GANGRENE: Primary | ICD-10-CM

## 2024-10-15 NOTE — TELEPHONE ENCOUNTER
Received response from cardiology.  He has been cleared to proceed with surgery.  Contacted the patient and will schedule for lap right inguinal hernia repair surgery on 10/31/2024.

## 2024-10-16 ENCOUNTER — TELEPHONE (OUTPATIENT)
Dept: SURGERY | Facility: CLINIC | Age: 34
End: 2024-10-16
Payer: COMMERCIAL

## 2024-10-16 NOTE — TELEPHONE ENCOUNTER
----- Message from Cari MENJIVAR sent at 10/15/2024  2:19 PM EDT -----  Regarding: RE: Schedule surgery  Surgery scheduled.  ----- Message -----  From: Elvi Whitman MD  Sent: 10/15/2024   9:03 AM EDT  To: Cari Harding MA  Subject: Schedule surgery                                 I talked with Med.  He has been cleared from a cardiac standpoint to proceed with his hernia surgery.  Scheduled for laparoscopic right inguinal hernia repair surgery on 10/31/2024.  You may mail him the patient instructions.  Will get consent on day of surgery.  ----- Message -----  From: Scooter Malave MD  Sent: 10/14/2024   3:35 PM EDT  To: Master Stewart RN; Elvi Whitman MD; #  Subject: RE: cardiac clearance for surgery                He should be able to proceed with his surgery without additional cardiac workup.  ----- Message -----  From: Elvi Whitman MD  Sent: 10/14/2024  11:22 AM EDT  To: Scooter Malave MD  Subject: cardiac clearance for surgery                    I saw that he completed his cardiac echo.  Do you anticipate further testing or treatment?  Or is he cleared from a cardiac standpoint to proceed with rescheduling of his hernia surgery?

## 2024-10-22 ENCOUNTER — APPOINTMENT (OUTPATIENT)
Dept: CARDIOLOGY | Facility: CLINIC | Age: 34
End: 2024-10-22
Payer: COMMERCIAL

## 2024-10-26 ENCOUNTER — ANESTHESIA EVENT (OUTPATIENT)
Dept: OPERATING ROOM | Facility: HOSPITAL | Age: 34
End: 2024-10-26
Payer: COMMERCIAL

## 2024-10-28 ENCOUNTER — PREP FOR PROCEDURE (OUTPATIENT)
Dept: SURGERY | Facility: HOSPITAL | Age: 34
End: 2024-10-28
Payer: COMMERCIAL

## 2024-10-31 ENCOUNTER — HOSPITAL ENCOUNTER (OUTPATIENT)
Facility: HOSPITAL | Age: 34
Setting detail: OUTPATIENT SURGERY
Discharge: HOME | End: 2024-10-31
Attending: SURGERY | Admitting: SURGERY
Payer: COMMERCIAL

## 2024-10-31 ENCOUNTER — ANESTHESIA (OUTPATIENT)
Dept: OPERATING ROOM | Facility: HOSPITAL | Age: 34
End: 2024-10-31
Payer: COMMERCIAL

## 2024-10-31 ENCOUNTER — PHARMACY VISIT (OUTPATIENT)
Dept: PHARMACY | Facility: CLINIC | Age: 34
End: 2024-10-31
Payer: COMMERCIAL

## 2024-10-31 VITALS
OXYGEN SATURATION: 98 % | WEIGHT: 160 LBS | TEMPERATURE: 97.4 F | BODY MASS INDEX: 22.9 KG/M2 | RESPIRATION RATE: 16 BRPM | DIASTOLIC BLOOD PRESSURE: 85 MMHG | HEART RATE: 66 BPM | SYSTOLIC BLOOD PRESSURE: 127 MMHG | HEIGHT: 70 IN

## 2024-10-31 DIAGNOSIS — K40.90 INGUINAL HERNIA OF RIGHT SIDE WITHOUT OBSTRUCTION OR GANGRENE: Primary | ICD-10-CM

## 2024-10-31 PROCEDURE — C1781 MESH (IMPLANTABLE): HCPCS | Performed by: SURGERY

## 2024-10-31 PROCEDURE — 49650 LAP ING HERNIA REPAIR INIT: CPT | Performed by: SURGERY

## 2024-10-31 PROCEDURE — 2720000007 HC OR 272 NO HCPCS: Performed by: SURGERY

## 2024-10-31 PROCEDURE — 7100000002 HC RECOVERY ROOM TIME - EACH INCREMENTAL 1 MINUTE: Performed by: SURGERY

## 2024-10-31 PROCEDURE — 3700000002 HC GENERAL ANESTHESIA TIME - EACH INCREMENTAL 1 MINUTE: Performed by: SURGERY

## 2024-10-31 PROCEDURE — 3700000001 HC GENERAL ANESTHESIA TIME - INITIAL BASE CHARGE: Performed by: SURGERY

## 2024-10-31 PROCEDURE — 2780000003 HC OR 278 NO HCPCS: Performed by: SURGERY

## 2024-10-31 PROCEDURE — 7100000009 HC PHASE TWO TIME - INITIAL BASE CHARGE: Performed by: SURGERY

## 2024-10-31 PROCEDURE — 2500000005 HC RX 250 GENERAL PHARMACY W/O HCPCS: Performed by: SURGERY

## 2024-10-31 PROCEDURE — 3600000004 HC OR TIME - INITIAL BASE CHARGE - PROCEDURE LEVEL FOUR: Performed by: SURGERY

## 2024-10-31 PROCEDURE — 2500000004 HC RX 250 GENERAL PHARMACY W/ HCPCS (ALT 636 FOR OP/ED): Mod: JZ | Performed by: SURGERY

## 2024-10-31 PROCEDURE — 3600000009 HC OR TIME - EACH INCREMENTAL 1 MINUTE - PROCEDURE LEVEL FOUR: Performed by: SURGERY

## 2024-10-31 PROCEDURE — 7100000001 HC RECOVERY ROOM TIME - INITIAL BASE CHARGE: Performed by: SURGERY

## 2024-10-31 PROCEDURE — 7100000010 HC PHASE TWO TIME - EACH INCREMENTAL 1 MINUTE: Performed by: SURGERY

## 2024-10-31 PROCEDURE — RXMED WILLOW AMBULATORY MEDICATION CHARGE

## 2024-10-31 PROCEDURE — 2500000004 HC RX 250 GENERAL PHARMACY W/ HCPCS (ALT 636 FOR OP/ED): Performed by: NURSE ANESTHETIST, CERTIFIED REGISTERED

## 2024-10-31 PROCEDURE — 2500000004 HC RX 250 GENERAL PHARMACY W/ HCPCS (ALT 636 FOR OP/ED): Performed by: ANESTHESIOLOGY

## 2024-10-31 DEVICE — LAPAROSCOPIC SELF-FIXATING MESH POLYESTER WITH POLYLACTIC ACID GRIPS AND COLLAGEN FILM
Type: IMPLANTABLE DEVICE | Site: INGUINAL | Status: FUNCTIONAL
Brand: PROGRIP

## 2024-10-31 RX ORDER — DROPERIDOL 2.5 MG/ML
0.62 INJECTION, SOLUTION INTRAMUSCULAR; INTRAVENOUS ONCE AS NEEDED
Status: DISCONTINUED | OUTPATIENT
Start: 2024-10-31 | End: 2024-10-31 | Stop reason: HOSPADM

## 2024-10-31 RX ORDER — FENTANYL CITRATE 50 UG/ML
INJECTION, SOLUTION INTRAMUSCULAR; INTRAVENOUS AS NEEDED
Status: DISCONTINUED | OUTPATIENT
Start: 2024-10-31 | End: 2024-10-31

## 2024-10-31 RX ORDER — SCOLOPAMINE TRANSDERMAL SYSTEM 1 MG/1
1 PATCH, EXTENDED RELEASE TRANSDERMAL ONCE
Status: DISCONTINUED | OUTPATIENT
Start: 2024-10-31 | End: 2024-10-31 | Stop reason: HOSPADM

## 2024-10-31 RX ORDER — LIDOCAINE HCL/PF 100 MG/5ML
SYRINGE (ML) INTRAVENOUS AS NEEDED
Status: DISCONTINUED | OUTPATIENT
Start: 2024-10-31 | End: 2024-10-31

## 2024-10-31 RX ORDER — PROPOFOL 10 MG/ML
INJECTION, EMULSION INTRAVENOUS AS NEEDED
Status: DISCONTINUED | OUTPATIENT
Start: 2024-10-31 | End: 2024-10-31

## 2024-10-31 RX ORDER — HYDROCODONE BITARTRATE AND ACETAMINOPHEN 7.5; 325 MG/1; MG/1
1 TABLET ORAL EVERY 6 HOURS PRN
Qty: 10 TABLET | Refills: 0 | Status: SHIPPED | OUTPATIENT
Start: 2024-10-31

## 2024-10-31 RX ORDER — ALBUTEROL SULFATE 0.83 MG/ML
2.5 SOLUTION RESPIRATORY (INHALATION) ONCE AS NEEDED
Status: DISCONTINUED | OUTPATIENT
Start: 2024-10-31 | End: 2024-10-31 | Stop reason: HOSPADM

## 2024-10-31 RX ORDER — LIDOCAINE HYDROCHLORIDE 10 MG/ML
0.1 INJECTION, SOLUTION EPIDURAL; INFILTRATION; INTRACAUDAL; PERINEURAL ONCE
Status: DISCONTINUED | OUTPATIENT
Start: 2024-10-31 | End: 2024-10-31 | Stop reason: HOSPADM

## 2024-10-31 RX ORDER — CEFAZOLIN SODIUM 2 G/100ML
2 INJECTION, SOLUTION INTRAVENOUS ONCE
Status: COMPLETED | OUTPATIENT
Start: 2024-10-31 | End: 2024-10-31

## 2024-10-31 RX ORDER — OXYCODONE AND ACETAMINOPHEN 5; 325 MG/1; MG/1
1 TABLET ORAL EVERY 4 HOURS PRN
Status: DISCONTINUED | OUTPATIENT
Start: 2024-10-31 | End: 2024-10-31 | Stop reason: HOSPADM

## 2024-10-31 RX ORDER — FAMOTIDINE 10 MG/ML
20 INJECTION INTRAVENOUS ONCE
Status: COMPLETED | OUTPATIENT
Start: 2024-10-31 | End: 2024-10-31

## 2024-10-31 RX ORDER — DEXMEDETOMIDINE HYDROCHLORIDE 100 UG/ML
INJECTION, SOLUTION INTRAVENOUS AS NEEDED
Status: DISCONTINUED | OUTPATIENT
Start: 2024-10-31 | End: 2024-10-31

## 2024-10-31 RX ORDER — MORPHINE SULFATE 2 MG/ML
2 INJECTION, SOLUTION INTRAMUSCULAR; INTRAVENOUS EVERY 5 MIN PRN
Status: DISCONTINUED | OUTPATIENT
Start: 2024-10-31 | End: 2024-10-31 | Stop reason: HOSPADM

## 2024-10-31 RX ORDER — BUPIVACAINE HCL/EPINEPHRINE 0.5-1:200K
VIAL (ML) INJECTION AS NEEDED
Status: DISCONTINUED | OUTPATIENT
Start: 2024-10-31 | End: 2024-10-31 | Stop reason: HOSPADM

## 2024-10-31 RX ORDER — ONDANSETRON HYDROCHLORIDE 2 MG/ML
4 INJECTION, SOLUTION INTRAVENOUS ONCE
Status: COMPLETED | OUTPATIENT
Start: 2024-10-31 | End: 2024-10-31

## 2024-10-31 RX ORDER — SODIUM CHLORIDE, SODIUM LACTATE, POTASSIUM CHLORIDE, CALCIUM CHLORIDE 600; 310; 30; 20 MG/100ML; MG/100ML; MG/100ML; MG/100ML
100 INJECTION, SOLUTION INTRAVENOUS CONTINUOUS
Status: DISCONTINUED | OUTPATIENT
Start: 2024-10-31 | End: 2024-10-31 | Stop reason: HOSPADM

## 2024-10-31 RX ORDER — HYDRALAZINE HYDROCHLORIDE 20 MG/ML
5 INJECTION INTRAMUSCULAR; INTRAVENOUS EVERY 30 MIN PRN
Status: DISCONTINUED | OUTPATIENT
Start: 2024-10-31 | End: 2024-10-31 | Stop reason: HOSPADM

## 2024-10-31 RX ORDER — MEPERIDINE HYDROCHLORIDE 25 MG/ML
12.5 INJECTION INTRAMUSCULAR; INTRAVENOUS; SUBCUTANEOUS EVERY 10 MIN PRN
Status: DISCONTINUED | OUTPATIENT
Start: 2024-10-31 | End: 2024-10-31 | Stop reason: HOSPADM

## 2024-10-31 RX ORDER — LABETALOL HYDROCHLORIDE 5 MG/ML
5 INJECTION, SOLUTION INTRAVENOUS ONCE AS NEEDED
Status: DISCONTINUED | OUTPATIENT
Start: 2024-10-31 | End: 2024-10-31 | Stop reason: HOSPADM

## 2024-10-31 RX ORDER — MIDAZOLAM HYDROCHLORIDE 1 MG/ML
INJECTION, SOLUTION INTRAMUSCULAR; INTRAVENOUS AS NEEDED
Status: DISCONTINUED | OUTPATIENT
Start: 2024-10-31 | End: 2024-10-31

## 2024-10-31 RX ORDER — ONDANSETRON HYDROCHLORIDE 2 MG/ML
4 INJECTION, SOLUTION INTRAVENOUS ONCE AS NEEDED
Status: DISCONTINUED | OUTPATIENT
Start: 2024-10-31 | End: 2024-10-31 | Stop reason: HOSPADM

## 2024-10-31 RX ORDER — ONDANSETRON HYDROCHLORIDE 2 MG/ML
INJECTION, SOLUTION INTRAVENOUS AS NEEDED
Status: DISCONTINUED | OUTPATIENT
Start: 2024-10-31 | End: 2024-10-31

## 2024-10-31 RX ORDER — ROCURONIUM BROMIDE 10 MG/ML
INJECTION, SOLUTION INTRAVENOUS AS NEEDED
Status: DISCONTINUED | OUTPATIENT
Start: 2024-10-31 | End: 2024-10-31

## 2024-10-31 RX ORDER — DIPHENHYDRAMINE HYDROCHLORIDE 50 MG/ML
12.5 INJECTION INTRAMUSCULAR; INTRAVENOUS ONCE AS NEEDED
Status: DISCONTINUED | OUTPATIENT
Start: 2024-10-31 | End: 2024-10-31 | Stop reason: HOSPADM

## 2024-10-31 SDOH — HEALTH STABILITY: MENTAL HEALTH: CURRENT SMOKER: 0

## 2024-10-31 ASSESSMENT — PAIN SCALES - GENERAL
PAINLEVEL_OUTOF10: 4
PAINLEVEL_OUTOF10: 6
PAINLEVEL_OUTOF10: 7
PAINLEVEL_OUTOF10: 8
PAINLEVEL_OUTOF10: 0 - NO PAIN
PAINLEVEL_OUTOF10: 4
PAINLEVEL_OUTOF10: 0 - NO PAIN
PAINLEVEL_OUTOF10: 8
PAINLEVEL_OUTOF10: 0 - NO PAIN
PAINLEVEL_OUTOF10: 7
PAIN_LEVEL: 5
PAINLEVEL_OUTOF10: 6
PAINLEVEL_OUTOF10: 8
PAINLEVEL_OUTOF10: 2
PAINLEVEL_OUTOF10: 6

## 2024-10-31 ASSESSMENT — PAIN - FUNCTIONAL ASSESSMENT
PAIN_FUNCTIONAL_ASSESSMENT: 0-10

## 2024-10-31 ASSESSMENT — COLUMBIA-SUICIDE SEVERITY RATING SCALE - C-SSRS
1. IN THE PAST MONTH, HAVE YOU WISHED YOU WERE DEAD OR WISHED YOU COULD GO TO SLEEP AND NOT WAKE UP?: NO
2. HAVE YOU ACTUALLY HAD ANY THOUGHTS OF KILLING YOURSELF?: NO
6. HAVE YOU EVER DONE ANYTHING, STARTED TO DO ANYTHING, OR PREPARED TO DO ANYTHING TO END YOUR LIFE?: NO

## 2024-11-08 ENCOUNTER — OFFICE VISIT (OUTPATIENT)
Dept: SURGERY | Facility: CLINIC | Age: 34
End: 2024-11-08
Payer: COMMERCIAL

## 2024-11-08 VITALS
BODY MASS INDEX: 23.11 KG/M2 | WEIGHT: 161.4 LBS | SYSTOLIC BLOOD PRESSURE: 113 MMHG | HEART RATE: 91 BPM | OXYGEN SATURATION: 98 % | HEIGHT: 70 IN | DIASTOLIC BLOOD PRESSURE: 80 MMHG

## 2024-11-08 DIAGNOSIS — K40.90 INGUINAL HERNIA OF RIGHT SIDE WITHOUT OBSTRUCTION OR GANGRENE: Primary | ICD-10-CM

## 2024-11-08 PROCEDURE — 3008F BODY MASS INDEX DOCD: CPT | Performed by: SURGERY

## 2024-11-08 PROCEDURE — 99024 POSTOP FOLLOW-UP VISIT: CPT | Performed by: SURGERY

## 2024-11-08 PROCEDURE — 1036F TOBACCO NON-USER: CPT | Performed by: SURGERY

## 2024-11-08 RX ORDER — HYDROCODONE BITARTRATE AND ACETAMINOPHEN 7.5; 325 MG/1; MG/1
1 TABLET ORAL EVERY 6 HOURS PRN
Qty: 10 TABLET | Refills: 0 | Status: SHIPPED | OUTPATIENT
Start: 2024-11-08 | End: 2024-11-11

## 2024-11-08 ASSESSMENT — ENCOUNTER SYMPTOMS
FATIGUE: 0
FREQUENCY: 0
VOMITING: 0
HEMATURIA: 0
CONSTIPATION: 0
ABDOMINAL PAIN: 1
EYE PAIN: 0
CHILLS: 0
CONFUSION: 0
POLYPHAGIA: 0
HEADACHES: 0
NAUSEA: 0
SPEECH DIFFICULTY: 0
EYE REDNESS: 0
WEAKNESS: 0
FLANK PAIN: 0
AGITATION: 0
DIARRHEA: 0
COUGH: 0
DYSURIA: 0
BRUISES/BLEEDS EASILY: 0
ARTHRALGIAS: 1
SHORTNESS OF BREATH: 0
MYALGIAS: 0
FEVER: 0
WOUND: 1

## 2024-11-08 NOTE — PROGRESS NOTES
GENERAL SURGERY OFFICE NOTE    Patient: Med Mireles    Age: 34 y.o.   Gender: male    MRN: [id number]    PCP: ONELIA Young        SUBJECTIVE     Chief Complaint  Follow-up (Patient is here for a 1 week post op laparoscopic right inguinal hernia repair with mesh done 10/31/24. Patient states that the incision on the right side has some redness and bleeding. Patient states that the bleeding has stopped. Patient states that he has a constant pain/tenderness. )       ULISSES Jovel returns to the office for 1-week postop check after undergoing a laparoscopic right inguinal hernia repair with mesh surgery.  At the time of his surgery, he was found to have a small direct and a small indirect inguinal hernia on the right.  No obvious hernia on the left.  He returned to the office 1 week early for his 2-week postop check complaining of pain at his 10 mm port site in the right abdomen.  He had used up all 10 of his Norco tablets, and was not using any Tylenol or ibuprofen.  Yesterday was his first day back to work.  He was sitting for about 6 hours.  He states that he had increased pain at the 10 mm right lateral port site and thought that there was some increased erythema associated with the port.  He did use ice to the right groin for a few days after surgery, but has not used any in the last several days.  He is back to eating and drinking well without any nausea or vomiting.  Bowel and bladder habits are back to baseline.  He was requesting more pain medication.    ROS  Review of Systems   Constitutional:  Negative for chills, fatigue and fever.   HENT:  Negative for congestion, ear pain and hearing loss.    Eyes:  Negative for pain and redness.   Respiratory:  Negative for cough and shortness of breath.    Cardiovascular:  Negative for chest pain and leg swelling.   Gastrointestinal:  Positive for abdominal pain. Negative for constipation, diarrhea, nausea and vomiting.   Endocrine: Negative for  "polyphagia.   Genitourinary:  Negative for dysuria, flank pain, frequency and hematuria.   Musculoskeletal:  Positive for arthralgias. Negative for myalgias.   Skin:  Positive for wound. Negative for rash.   Allergic/Immunologic: Negative for immunocompromised state.   Neurological:  Negative for speech difficulty, weakness and headaches.   Hematological:  Does not bruise/bleed easily.   Psychiatric/Behavioral:  Negative for agitation and confusion.           HISTORY     Past Medical History:   Diagnosis Date    Irritable bowel syndrome     Migraines     Palpitations         Past Surgical History:   Procedure Laterality Date    HERNIA REPAIR Right 10/31/2024    Laparoscopic right inguinal hernia repair with mesh        Family History   Problem Relation Name Age of Onset    Heart disease Mother Gin         Allergies   Allergen Reactions    Aspirin Anaphylaxis        Social History     Tobacco Use   Smoking Status Former    Current packs/day: 0.50    Average packs/day: 0.5 packs/day for 5.0 years (2.5 ttl pk-yrs)    Types: Cigarettes    Passive exposure: Past   Smokeless Tobacco Never        Social History     Substance and Sexual Activity   Alcohol Use Yes    Comment: Monthly        HOME MEDICATIONS  Current Outpatient Medications   Medication Instructions    HYDROcodone-acetaminophen (Norco) 7.5-325 mg tablet 1 tablet, oral, Every 6 hours PRN          OBJECTIVE   Last Recorded Vitals.  Blood pressure 113/80, pulse 91, height 1.778 m (5' 10\"), weight 73.2 kg (161 lb 6.4 oz), SpO2 98%.     PHYSICAL EXAM  Physical Exam   General: Well-developed, well-nourished and in no acute distress.  Thin body habitus.  Head: Normocephalic. Atraumatic.  Neck/thyroid: Neck is supple.   Eyes: Pupils equal round and reactive to light. Conjunctiva normal.  ENMT: No masses or deformity of external nose. External ears without masses.  Respiratory/Chest:  Normal respiratory effort.  Cardiovascular: Regular rate and rhythm.   Abdomen: " Soft, nontender, nondistended. No hernias.  Laparoscopic incisions healing well without any erythema or hernia.  Mild resolving bruise just below the umbilicus.  No drainage or bleeding from any of the incisions.  Rectal: Deferred  : Normal external genitalia.  With Valsalva, no evidence of recurrent hernia of either side.  Small 1 x 1 cm seroma overlying the right external ring without any associated erythema.  Musculoskeletal: Joints and limbs are grossly normal. Normal gait. Normal range of motion of major joints.  Neuro: Oriented to person, place and time. No obvious neurological deficit. Motor strength grossly normal.  Psych: Normal mood and affect.    RESULTS   Labs  No results found for this or any previous visit (from the past 24 hours).    Radiology Resutls  No results found.       ASSESSMENT / PLAN   Diagnoses and all orders for this visit:  Inguinal hernia of right side without obstruction or gangrene  -     HYDROcodone-acetaminophen (Norco) 7.5-325 mg tablet; Take 1 tablet by mouth every 6 hours if needed for severe pain (7 - 10) (pain) for up to 3 days.        Plan  1.  He has no evidence of infection at any of his incisions, and the bruising by the umbilicus is normal and already resolving.  Discussed that his pain is pretty typical for postoperative pain.  He was encouraged to use multimodality pain management which would include alternating Tylenol/ibuprofen, as well as ice/heat.  He had requested a refill of the Norco which was sent to his pharmacy.  2.  He will maintain the 10 pound lifting restriction for 1 more week.  He will be seen in the office in 1 week for his routine 2-week postoperative check.  3.  There is a small seroma in the right inguinal area which is very common after a direct hernia repair.  This ought to resolve on its own.  4.  Since he does a lot of heavy lifting (80 pounds) repetitively at work, he will need a total of 4 weeks of light duty postoperatively before returning  back to full duty to decrease his risk of recurrent hernia, as well as, reduce his risk of hernia formation at his incision sites.        Elvi Whitman MD, FACS  [facility]  [address] [place] [address];   [address]  [place]  [address] [phone]

## 2024-11-08 NOTE — PATIENT INSTRUCTIONS
Continue with no lifting >10 pounds and no repetitive bending over for 1 more week. Keep follow up with Dr. Whitman in 1 week for your routine postoperative appointment.  No evidence of infection.  Place ice on the right inguinal area for 20 minutes 3 times per day to help with pain and swelling.  Alternate between taking ibuprofen and tylenol to help with pain.  A narcotic pain medication has been called into your pharmacy.  Use this sparingly.  This will be your last prescription for narcotics associated with your surgery.

## 2024-11-19 ENCOUNTER — APPOINTMENT (OUTPATIENT)
Facility: CLINIC | Age: 34
End: 2024-11-19
Payer: COMMERCIAL

## 2024-11-19 VITALS
DIASTOLIC BLOOD PRESSURE: 62 MMHG | HEIGHT: 70 IN | BODY MASS INDEX: 24.05 KG/M2 | SYSTOLIC BLOOD PRESSURE: 110 MMHG | HEART RATE: 94 BPM | WEIGHT: 168 LBS | OXYGEN SATURATION: 98 %

## 2024-11-19 DIAGNOSIS — K40.90 INGUINAL HERNIA OF RIGHT SIDE WITHOUT OBSTRUCTION OR GANGRENE: Primary | ICD-10-CM

## 2024-11-19 PROCEDURE — 1036F TOBACCO NON-USER: CPT | Performed by: SURGERY

## 2024-11-19 PROCEDURE — 3008F BODY MASS INDEX DOCD: CPT | Performed by: SURGERY

## 2024-11-19 PROCEDURE — 99024 POSTOP FOLLOW-UP VISIT: CPT | Performed by: SURGERY

## 2024-11-19 ASSESSMENT — ENCOUNTER SYMPTOMS
CONSTIPATION: 0
AGITATION: 0
SPEECH DIFFICULTY: 0
ARTHRALGIAS: 1
HEADACHES: 0
FLANK PAIN: 0
NAUSEA: 0
VOMITING: 0
WOUND: 1
SHORTNESS OF BREATH: 0
DYSURIA: 0
FREQUENCY: 0
MYALGIAS: 0
HEMATURIA: 0
WEAKNESS: 0
ABDOMINAL PAIN: 1
POLYPHAGIA: 0
CHILLS: 0
EYE PAIN: 0
COUGH: 0
FEVER: 0
BRUISES/BLEEDS EASILY: 0
FATIGUE: 0
EYE REDNESS: 0
DIARRHEA: 0
CONFUSION: 0

## 2024-11-19 NOTE — PATIENT INSTRUCTIONS
You may gradually increase your physical activity as tolerated.  However, you should avoid any activity that causes pain at your incision sites, or at your hernia repair site.  Would not do any strenuous lifting for at least 3 months following your surgery.  You should maintain light duty at work for 2 more weeks  3.  If you have any other questions or concerns regarding your hernia surgery, please call Dr. Whitman's office.

## 2024-11-19 NOTE — PROGRESS NOTES
GENERAL SURGERY OFFICE NOTE    Patient: Med Mireles    Age: 34 y.o.   Gender: male    MRN: [id number]    PCP: ONELIA Young        SUBJECTIVE     Chief Complaint  Follow-up (Patient is here for a 1 week wound check. Patient states that he is healing well.)       ULISSES Jovel returns to the office for 2-week postop check after undergoing a laparoscopic right inguinal hernia repair with mesh surgery.  At the time of his surgery, he was found to have a small direct and a small indirect inguinal hernia on the right.  No obvious hernia on the left.  He was seen at 1 week postop due to postoperative pain, and was given a second prescription for Norco.  He is to total of 20 postop.  He states he is not currently using any pain medication.  He is not using Tylenol or Profen.  He states that he felt well enough that yesterday he went down a slide, but started to have some discomfort after performing this activity.  He has not noticed any recurrent bulge in the groin area.  No pain in the inguinal area he went back to work last Monday, but remains on light duty for total of 4 weeks postoperatively.  He is without any nausea or vomiting..    ROS  Review of Systems   Constitutional:  Negative for chills, fatigue and fever.   HENT:  Negative for congestion, ear pain and hearing loss.    Eyes:  Negative for pain and redness.   Respiratory:  Negative for cough and shortness of breath.    Cardiovascular:  Negative for chest pain and leg swelling.   Gastrointestinal:  Positive for abdominal pain. Negative for constipation, diarrhea, nausea and vomiting.   Endocrine: Negative for polyphagia.   Genitourinary:  Negative for dysuria, flank pain, frequency and hematuria.   Musculoskeletal:  Positive for arthralgias. Negative for myalgias.   Skin:  Positive for wound. Negative for rash.   Allergic/Immunologic: Negative for immunocompromised state.   Neurological:  Negative for speech difficulty, weakness and  "headaches.   Hematological:  Does not bruise/bleed easily.   Psychiatric/Behavioral:  Negative for agitation and confusion.           HISTORY     Past Medical History:   Diagnosis Date    Inguinal hernia of right side without obstruction or gangrene 07/31/2024    Irritable bowel syndrome     Migraines     Palpitations         Past Surgical History:   Procedure Laterality Date    HERNIA REPAIR Right 10/31/2024    Laparoscopic right inguinal hernia repair with mesh        Family History   Problem Relation Name Age of Onset    Heart disease Mother Gin         Allergies   Allergen Reactions    Aspirin Anaphylaxis        Social History     Tobacco Use   Smoking Status Former    Current packs/day: 0.50    Average packs/day: 0.5 packs/day for 5.0 years (2.5 ttl pk-yrs)    Types: Cigarettes    Passive exposure: Past   Smokeless Tobacco Never        Social History     Substance and Sexual Activity   Alcohol Use Yes    Comment: Monthly        HOME MEDICATIONS  No current outpatient medications         OBJECTIVE   Last Recorded Vitals.  Blood pressure 110/62, pulse 94, height 1.778 m (5' 10\"), weight 76.2 kg (168 lb), SpO2 98%.     PHYSICAL EXAM  Physical Exam   General: Well-developed, well-nourished and in no acute distress.  Thin body habitus.  Head: Normocephalic. Atraumatic.  Neck/thyroid: Neck is supple.   Eyes: Pupils equal round and reactive to light. Conjunctiva normal.  ENMT: No masses or deformity of external nose. External ears without masses.  Respiratory/Chest:  Normal respiratory effort.  Cardiovascular: Regular rate and rhythm.   Abdomen: Soft, nontender, nondistended. No hernias.  Laparoscopic incisions healing well without any erythema or hernia.  No bruising or erythema.  No drainage or bleeding from any of the incisions.  Rectal: Deferred  : Normal external genitalia.  With Valsalva, no evidence of recurrent hernia of either side.  The previous right sided seroma has resolved.  Musculoskeletal: Joints " and limbs are grossly normal. Normal gait. Normal range of motion of major joints.  Neuro: Oriented to person, place and time. No obvious neurological deficit. Motor strength grossly normal.  Psych: Normal mood and affect.    RESULTS   Labs  No results found for this or any previous visit (from the past 24 hours).    Radiology Resutls  No results found.       ASSESSMENT / PLAN   Diagnoses and all orders for this visit:  Inguinal hernia of right side without obstruction or gangrene          Plan  1.  Healing well from a surgical standpoint.  Reviewed with patient that the incisional pain ought to continue to improve over the next several weeks.  However, there can be some increased pain in the groin area due to scarring and shrinkage of the mesh over the next 2-3 months.  If this pain becomes severe, the patient was encouraged to contact my office for reevaluation.  2.  Local wound care was reviewed.  3.  Return to activity as tolerated.  4.  Referred back to primary care physician for all further medical care, but may be referred back to my office for any further surgical needs.  5.  Since he does a lot of heavy lifting (80 pounds) repetitively at work, he will need a total of 4 weeks of light duty postoperatively before returning back to full duty to decrease his risk of recurrent hernia, as well as, reduce his risk of hernia formation at his incision sites.    6.  The previous seroma has resolved.        Elvi Whitman MD, FACS   [facility]  [address] [place] [address];   [employer] [address]  [place]  [address] [phone]

## 2024-12-03 ENCOUNTER — APPOINTMENT (OUTPATIENT)
Dept: CARDIOLOGY | Facility: CLINIC | Age: 34
End: 2024-12-03
Payer: COMMERCIAL

## 2025-01-24 ENCOUNTER — APPOINTMENT (OUTPATIENT)
Dept: PRIMARY CARE | Facility: CLINIC | Age: 35
End: 2025-01-24
Payer: COMMERCIAL

## 2025-01-24 ENCOUNTER — LAB (OUTPATIENT)
Dept: LAB | Facility: LAB | Age: 35
End: 2025-01-24
Payer: COMMERCIAL

## 2025-01-24 DIAGNOSIS — R73.9 HYPERGLYCEMIA: ICD-10-CM

## 2025-01-24 DIAGNOSIS — R73.9 HYPERGLYCEMIA: Primary | ICD-10-CM

## 2025-01-24 LAB
EST. AVERAGE GLUCOSE BLD GHB EST-MCNC: 80 MG/DL
HBA1C MFR BLD: 4.4 %

## 2025-01-24 PROCEDURE — 83036 HEMOGLOBIN GLYCOSYLATED A1C: CPT

## 2025-01-25 ENCOUNTER — LAB (OUTPATIENT)
Dept: LAB | Facility: LAB | Age: 35
End: 2025-01-25
Payer: COMMERCIAL

## 2025-01-25 DIAGNOSIS — Z00.00 ANNUAL PHYSICAL EXAM: ICD-10-CM

## 2025-01-25 DIAGNOSIS — Z11.3 SCREENING FOR STD (SEXUALLY TRANSMITTED DISEASE): ICD-10-CM

## 2025-01-25 DIAGNOSIS — Z11.59 NEED FOR HEPATITIS C SCREENING TEST: ICD-10-CM

## 2025-01-25 LAB
ALBUMIN SERPL BCP-MCNC: 4.6 G/DL (ref 3.4–5)
ALP SERPL-CCNC: 62 U/L (ref 33–120)
ALT SERPL W P-5'-P-CCNC: 30 U/L (ref 10–52)
ANION GAP SERPL CALC-SCNC: 11 MMOL/L (ref 10–20)
AST SERPL W P-5'-P-CCNC: 20 U/L (ref 9–39)
BILIRUB SERPL-MCNC: 1.2 MG/DL (ref 0–1.2)
BUN SERPL-MCNC: 16 MG/DL (ref 6–23)
CALCIUM SERPL-MCNC: 9.4 MG/DL (ref 8.6–10.3)
CHLORIDE SERPL-SCNC: 102 MMOL/L (ref 98–107)
CHOLEST SERPL-MCNC: 222 MG/DL (ref 0–199)
CHOLESTEROL/HDL RATIO: 3.2
CO2 SERPL-SCNC: 28 MMOL/L (ref 21–32)
CREAT SERPL-MCNC: 0.88 MG/DL (ref 0.5–1.3)
EGFRCR SERPLBLD CKD-EPI 2021: >90 ML/MIN/1.73M*2
ERYTHROCYTE [DISTWIDTH] IN BLOOD BY AUTOMATED COUNT: 12.7 % (ref 11.5–14.5)
GLUCOSE SERPL-MCNC: 80 MG/DL (ref 74–99)
HCT VFR BLD AUTO: 44 % (ref 41–52)
HCV AB SER QL: NONREACTIVE
HDLC SERPL-MCNC: 69.6 MG/DL
HGB BLD-MCNC: 15.1 G/DL (ref 13.5–17.5)
HIV 1+2 AB+HIV1 P24 AG SERPL QL IA: NONREACTIVE
LDLC SERPL CALC-MCNC: 140 MG/DL
MCH RBC QN AUTO: 29.9 PG (ref 26–34)
MCHC RBC AUTO-ENTMCNC: 34.3 G/DL (ref 32–36)
MCV RBC AUTO: 87 FL (ref 80–100)
NON HDL CHOLESTEROL: 152 MG/DL (ref 0–149)
NRBC BLD-RTO: 0 /100 WBCS (ref 0–0)
PLATELET # BLD AUTO: 262 X10*3/UL (ref 150–450)
POTASSIUM SERPL-SCNC: 4.7 MMOL/L (ref 3.5–5.3)
PROT SERPL-MCNC: 7.3 G/DL (ref 6.4–8.2)
RBC # BLD AUTO: 5.05 X10*6/UL (ref 4.5–5.9)
SODIUM SERPL-SCNC: 136 MMOL/L (ref 136–145)
TRIGL SERPL-MCNC: 62 MG/DL (ref 0–149)
TSH SERPL-ACNC: 0.68 MIU/L (ref 0.44–3.98)
VIT B12 SERPL-MCNC: 549 PG/ML (ref 211–911)
VLDL: 12 MG/DL (ref 0–40)
WBC # BLD AUTO: 4.3 X10*3/UL (ref 4.4–11.3)

## 2025-01-25 PROCEDURE — 85027 COMPLETE CBC AUTOMATED: CPT

## 2025-01-25 PROCEDURE — 80053 COMPREHEN METABOLIC PANEL: CPT

## 2025-01-25 PROCEDURE — 82607 VITAMIN B-12: CPT

## 2025-01-25 PROCEDURE — 86803 HEPATITIS C AB TEST: CPT

## 2025-01-25 PROCEDURE — 36415 COLL VENOUS BLD VENIPUNCTURE: CPT

## 2025-01-25 PROCEDURE — 84443 ASSAY THYROID STIM HORMONE: CPT

## 2025-01-25 PROCEDURE — 84207 ASSAY OF VITAMIN B-6: CPT

## 2025-01-25 PROCEDURE — 87389 HIV-1 AG W/HIV-1&-2 AB AG IA: CPT

## 2025-01-25 PROCEDURE — 80061 LIPID PANEL: CPT

## 2025-01-28 LAB — PYRIDOXAL PHOS SERPL-SCNC: 184.5 NMOL/L (ref 20–125)

## 2025-02-04 ENCOUNTER — APPOINTMENT (OUTPATIENT)
Dept: PRIMARY CARE | Facility: CLINIC | Age: 35
End: 2025-02-04
Payer: COMMERCIAL

## 2025-02-04 VITALS — WEIGHT: 170 LBS | HEIGHT: 70 IN | BODY MASS INDEX: 24.34 KG/M2

## 2025-02-04 DIAGNOSIS — R61 EXCESSIVE SWEATING: Primary | ICD-10-CM

## 2025-02-04 PROCEDURE — 1036F TOBACCO NON-USER: CPT

## 2025-02-04 PROCEDURE — 99212 OFFICE O/P EST SF 10 MIN: CPT

## 2025-02-04 PROCEDURE — 3008F BODY MASS INDEX DOCD: CPT

## 2025-02-04 RX ORDER — PROPRANOLOL HYDROCHLORIDE 10 MG/1
10 TABLET ORAL 3 TIMES DAILY
Qty: 90 TABLET | Refills: 11 | Status: SHIPPED | OUTPATIENT
Start: 2025-02-04 | End: 2026-02-04

## 2025-02-04 SDOH — ECONOMIC STABILITY: FOOD INSECURITY: WITHIN THE PAST 12 MONTHS, THE FOOD YOU BOUGHT JUST DIDN'T LAST AND YOU DIDN'T HAVE MONEY TO GET MORE.: NEVER TRUE

## 2025-02-04 SDOH — ECONOMIC STABILITY: FOOD INSECURITY: WITHIN THE PAST 12 MONTHS, YOU WORRIED THAT YOUR FOOD WOULD RUN OUT BEFORE YOU GOT MONEY TO BUY MORE.: NEVER TRUE

## 2025-02-04 ASSESSMENT — LIFESTYLE VARIABLES
HOW OFTEN DO YOU HAVE A DRINK CONTAINING ALCOHOL: NEVER
SKIP TO QUESTIONS 9-10: 1
AUDIT-C TOTAL SCORE: 0
HOW OFTEN DO YOU HAVE SIX OR MORE DRINKS ON ONE OCCASION: NEVER
HOW MANY STANDARD DRINKS CONTAINING ALCOHOL DO YOU HAVE ON A TYPICAL DAY: PATIENT DOES NOT DRINK

## 2025-02-04 ASSESSMENT — PAIN SCALES - GENERAL: PAINLEVEL_OUTOF10: 0-NO PAIN

## 2025-02-04 ASSESSMENT — ENCOUNTER SYMPTOMS
PSYCHIATRIC NEGATIVE: 1
HEMATOLOGIC/LYMPHATIC NEGATIVE: 1
GASTROINTESTINAL NEGATIVE: 1
CARDIOVASCULAR NEGATIVE: 1
MUSCULOSKELETAL NEGATIVE: 1
EYES NEGATIVE: 1
NEUROLOGICAL NEGATIVE: 1
ALLERGIC/IMMUNOLOGIC NEGATIVE: 1
ENDOCRINE COMMENTS: INCREASED SWEATING
RESPIRATORY NEGATIVE: 1
DIAPHORESIS: 1

## 2025-02-04 ASSESSMENT — PATIENT HEALTH QUESTIONNAIRE - PHQ9
1. LITTLE INTEREST OR PLEASURE IN DOING THINGS: NOT AT ALL
2. FEELING DOWN, DEPRESSED OR HOPELESS: NOT AT ALL
SUM OF ALL RESPONSES TO PHQ9 QUESTIONS 1 & 2: 0

## 2025-02-04 ASSESSMENT — ANXIETY QUESTIONNAIRES
GAD7 TOTAL SCORE: 0
6. BECOMING EASILY ANNOYED OR IRRITABLE: NOT AT ALL
2. NOT BEING ABLE TO STOP OR CONTROL WORRYING: NOT AT ALL
5. BEING SO RESTLESS THAT IT IS HARD TO SIT STILL: NOT AT ALL
1. FEELING NERVOUS, ANXIOUS, OR ON EDGE: NOT AT ALL
7. FEELING AFRAID AS IF SOMETHING AWFUL MIGHT HAPPEN: NOT AT ALL
4. TROUBLE RELAXING: NOT AT ALL
3. WORRYING TOO MUCH ABOUT DIFFERENT THINGS: NOT AT ALL

## 2025-02-04 NOTE — PROGRESS NOTES
"Subjective   Patient ID: Med Mireles is a 35 y.o. male who presents for Follow-up (Follow up over the phone, no concerns.).    HPI     Virtual or Telephone Consent    A telephone visit (audio only) between the patient (at the originating site) and the provider (at the distant site) was utilized to provide this telehealth service.   Verbal consent was requested and obtained from Med Mireles on this date, 02/04/25 for a telehealth visit.     Med presents for a phone visit to discuss the increased sweating that he has been experiencing. He endorses that this started right after his hernia surgery and it is worse when he is at work. He states that his workplace is not extremely hot and he does not feel anxious when the sweating starts, but then he experiences some anxiety due to self consciousness from the sweating. He states he has no lymphadenopathy, he has not been fevered, and he has not had any unintentional weight loss. Med states that he has been eating and drinking normally, and he has had regular bowel movements and no issues with urination. He has tried multiple different deodorants to see if any of them help with the excess sweat, and there has not been enough of a difference for him to really notice. He is agreeable to trying propranolol to see if it helps- prescription sent to pharmacy. He denies any recent illnesses, chest pain, or shortness of breath.   Labs reviewed in detail- no abnormalities.     Review of Systems   Constitutional:  Positive for diaphoresis.   HENT: Negative.     Eyes: Negative.    Respiratory: Negative.     Cardiovascular: Negative.    Gastrointestinal: Negative.    Endocrine:        Increased sweating   Genitourinary: Negative.    Musculoskeletal: Negative.    Skin: Negative.    Allergic/Immunologic: Negative.    Neurological: Negative.    Hematological: Negative.    Psychiatric/Behavioral: Negative.         Objective   Ht 1.778 m (5' 10\")   Wt 77.1 kg (170 lb)   BMI 24.39 " kg/m²     Physical Exam    Unable to perform assessment due to encounter being over the phone.     Assessment/Plan   Problem List Items Addressed This Visit             ICD-10-CM    Excessive sweating - Primary R61    Relevant Medications    propranolol (Inderal) 10 mg tablet

## 2025-02-10 DIAGNOSIS — R61 EXCESSIVE SWEATING: Primary | ICD-10-CM

## 2025-02-14 DIAGNOSIS — R61 HYPERHIDROSIS: Primary | ICD-10-CM

## 2025-02-14 RX ORDER — OXYBUTYNIN CHLORIDE 5 MG/1
5 TABLET ORAL 2 TIMES DAILY
Qty: 60 TABLET | Refills: 5 | Status: SHIPPED | OUTPATIENT
Start: 2025-02-14 | End: 2025-08-13

## 2025-04-21 ENCOUNTER — APPOINTMENT (OUTPATIENT)
Dept: PRIMARY CARE | Facility: CLINIC | Age: 35
End: 2025-04-21
Payer: COMMERCIAL

## 2025-05-16 DIAGNOSIS — R61 HYPERHIDROSIS: ICD-10-CM

## 2025-05-16 NOTE — TELEPHONE ENCOUNTER
Patient called into the office to follow up on request for increased dose/pako script of Oxybutin currently taking 1tablet at night and 2 in the morning. He is currently out of his prescription and is in need of a temporary  emergency fill to allow him to still be on the medication until next office visit. Confirmed Pharmacy as [facility]

## 2025-05-19 RX ORDER — OXYBUTYNIN CHLORIDE 5 MG/1
5 TABLET ORAL 2 TIMES DAILY
Qty: 60 TABLET | Refills: 5 | OUTPATIENT
Start: 2025-05-19 | End: 2025-11-15

## 2025-05-21 RX ORDER — OXYBUTYNIN CHLORIDE 5 MG/1
TABLET ORAL
Qty: 90 TABLET | Refills: 0 | Status: SHIPPED | OUTPATIENT
Start: 2025-05-21

## 2025-05-22 ENCOUNTER — TELEPHONE (OUTPATIENT)
Dept: PRIMARY CARE | Facility: CLINIC | Age: 35
End: 2025-05-22
Payer: COMMERCIAL

## 2025-06-10 ENCOUNTER — APPOINTMENT (OUTPATIENT)
Dept: PRIMARY CARE | Facility: CLINIC | Age: 35
End: 2025-06-10
Payer: COMMERCIAL

## 2025-06-10 VITALS
RESPIRATION RATE: 16 BRPM | BODY MASS INDEX: 25.77 KG/M2 | SYSTOLIC BLOOD PRESSURE: 122 MMHG | OXYGEN SATURATION: 96 % | WEIGHT: 180 LBS | TEMPERATURE: 96.9 F | HEART RATE: 80 BPM | HEIGHT: 70 IN | DIASTOLIC BLOOD PRESSURE: 79 MMHG

## 2025-06-10 DIAGNOSIS — R61 HYPERHIDROSIS: Primary | ICD-10-CM

## 2025-06-10 PROCEDURE — 99213 OFFICE O/P EST LOW 20 MIN: CPT | Performed by: STUDENT IN AN ORGANIZED HEALTH CARE EDUCATION/TRAINING PROGRAM

## 2025-06-10 PROCEDURE — 3008F BODY MASS INDEX DOCD: CPT | Performed by: STUDENT IN AN ORGANIZED HEALTH CARE EDUCATION/TRAINING PROGRAM

## 2025-06-10 PROCEDURE — 1036F TOBACCO NON-USER: CPT | Performed by: STUDENT IN AN ORGANIZED HEALTH CARE EDUCATION/TRAINING PROGRAM

## 2025-06-10 RX ORDER — OXYBUTYNIN CHLORIDE 5 MG/1
TABLET ORAL
Qty: 90 TABLET | Refills: 3 | Status: SHIPPED | OUTPATIENT
Start: 2025-06-10

## 2025-06-10 SDOH — ECONOMIC STABILITY: FOOD INSECURITY: WITHIN THE PAST 12 MONTHS, THE FOOD YOU BOUGHT JUST DIDN'T LAST AND YOU DIDN'T HAVE MONEY TO GET MORE.: NEVER TRUE

## 2025-06-10 SDOH — ECONOMIC STABILITY: FOOD INSECURITY: WITHIN THE PAST 12 MONTHS, YOU WORRIED THAT YOUR FOOD WOULD RUN OUT BEFORE YOU GOT MONEY TO BUY MORE.: NEVER TRUE

## 2025-06-10 ASSESSMENT — LIFESTYLE VARIABLES
HOW OFTEN DO YOU HAVE A DRINK CONTAINING ALCOHOL: MONTHLY OR LESS
AUDIT-C TOTAL SCORE: 1
HOW OFTEN DO YOU HAVE SIX OR MORE DRINKS ON ONE OCCASION: NEVER
SKIP TO QUESTIONS 9-10: 1
HOW MANY STANDARD DRINKS CONTAINING ALCOHOL DO YOU HAVE ON A TYPICAL DAY: 1 OR 2

## 2025-06-10 ASSESSMENT — ENCOUNTER SYMPTOMS
HEADACHES: 0
CONFUSION: 0
NAUSEA: 0
COUGH: 0
UNEXPECTED WEIGHT CHANGE: 0
SHORTNESS OF BREATH: 0
VOMITING: 0
FEVER: 0
MUSCULOSKELETAL NEGATIVE: 1
PALPITATIONS: 0
ABDOMINAL PAIN: 0
FATIGUE: 0
COLOR CHANGE: 0
WHEEZING: 0
CONSTIPATION: 0
DIARRHEA: 0
DIZZINESS: 0
CHILLS: 0

## 2025-06-10 NOTE — PROGRESS NOTES
"Subjective   Patient ID: Med Mireles is a 35 y.o. male who presents for Follow-up (No new concerns today.  Needs med refill).    HPI   Former [facility] pt here for follow up. Reports he is doing okay; states having excessive sweating x 5-6 mo; no such prior hx; reports he gained some weight; appetite been good. He had bld work (01/25/25) as TSH, A1c, HIV, CBC/CMP wnl. He taking oxybutynin 5 mg 2 tab in am and one nightly, works well. Prev tried propranolol w/o help. Also referred to see endo and has appt in 08/25. Reports remote h/o hyperhidrosis but not in the last few yrs.      Review of Systems   Constitutional:  Negative for chills, fatigue, fever and unexpected weight change.   HENT: Negative.     Respiratory:  Negative for cough, shortness of breath and wheezing.    Cardiovascular:  Negative for chest pain, palpitations and leg swelling.   Gastrointestinal:  Negative for abdominal pain, constipation, diarrhea, nausea and vomiting.   Musculoskeletal: Negative.    Skin:  Negative for color change and rash.   Neurological:  Negative for dizziness and headaches.   Psychiatric/Behavioral:  Negative for behavioral problems and confusion.        Objective   /79 (BP Location: Right arm, Patient Position: Sitting, BP Cuff Size: Adult)   Pulse 80   Temp 36.1 °C (96.9 °F) (Temporal)   Resp 16   Ht 1.778 m (5' 10\")   Wt 81.6 kg (180 lb)   SpO2 96%   BMI 25.83 kg/m²     Physical Exam  Vitals and nursing note reviewed.   Constitutional:       Appearance: Normal appearance.   Cardiovascular:      Rate and Rhythm: Normal rate and regular rhythm.      Pulses: Normal pulses.      Heart sounds: Normal heart sounds.   Pulmonary:      Effort: Pulmonary effort is normal.      Breath sounds: Normal breath sounds.   Abdominal:      General: Abdomen is flat. Bowel sounds are normal.      Palpations: Abdomen is soft.   Musculoskeletal:         General: Normal range of motion.   Neurological:      General: No focal deficit " present.      Mental Status: He is alert.   Psychiatric:         Mood and Affect: Mood normal.         Behavior: Behavior normal.         Assessment/Plan   [facility] pt here for follow up.  Unclear etiology of his hyperhidrosis, likely idiopathic in the setting of remote such hx vs other; pt will follow up with endo to discuss the problems in detail in few months. Appt date was notified to the patient. We will cont current meds for now, oxybutynin 5 mg, 2 tabs in am and 1 in the evening. He is otherwise clinically & vitally stable.   Problem List Items Addressed This Visit    None  Visit Diagnoses         Codes      Hyperhidrosis    -  Primary R61    Relevant Medications    oxyBUTYnin (Ditropan) 5 mg tablet          Rtc 5-6 mo for HM/FU     This note was partially generated using the Dragon Voice recognition system. There may be some incorrect wording, spelling and/or spelling errors or punctuation errors that were not corrected prior to committing the note to the medical record.      Omari Albarran MD    [facility]

## 2025-08-01 ENCOUNTER — APPOINTMENT (OUTPATIENT)
Dept: PRIMARY CARE | Facility: CLINIC | Age: 35
End: 2025-08-01
Payer: COMMERCIAL

## 2025-08-07 ENCOUNTER — APPOINTMENT (OUTPATIENT)
Dept: ENDOCRINOLOGY | Facility: CLINIC | Age: 35
End: 2025-08-07
Payer: COMMERCIAL

## 2025-08-26 ENCOUNTER — PATIENT MESSAGE (OUTPATIENT)
Dept: PRIMARY CARE | Facility: CLINIC | Age: 35
End: 2025-08-26
Payer: COMMERCIAL

## 2025-08-26 DIAGNOSIS — R61 HYPERHIDROSIS: ICD-10-CM

## 2025-08-26 RX ORDER — OXYBUTYNIN CHLORIDE 5 MG/1
TABLET ORAL
Qty: 90 TABLET | Refills: 3 | Status: SHIPPED | OUTPATIENT
Start: 2025-08-26

## 2025-08-28 ENCOUNTER — TELEPHONE (OUTPATIENT)
Dept: PRIMARY CARE | Facility: CLINIC | Age: 35
End: 2025-08-28
Payer: COMMERCIAL

## 2025-08-28 DIAGNOSIS — R61 HYPERHIDROSIS: ICD-10-CM

## 2025-08-28 DIAGNOSIS — R61 EXCESSIVE SWEATING: Primary | ICD-10-CM

## 2025-11-06 ENCOUNTER — APPOINTMENT (OUTPATIENT)
Dept: PRIMARY CARE | Facility: CLINIC | Age: 35
End: 2025-11-06
Payer: COMMERCIAL

## 2026-03-11 ENCOUNTER — APPOINTMENT (OUTPATIENT)
Dept: ENDOCRINOLOGY | Facility: CLINIC | Age: 36
End: 2026-03-11
Payer: COMMERCIAL

## (undated) DEVICE — COVER HANDLE LIGHT, STERIS, BLUE, STERILE

## (undated) DEVICE — SOLUTION, IRRIGATION, SODIUM CHLORIDE 0.9%, 1000 ML, POUR BOTTLE

## (undated) DEVICE — PREP, SCRUB, SKIN, FOAM, HIBICLENS, 4 OZ

## (undated) DEVICE — BANDAGE, ADHESIVE, FLEXIBLE FABRIC, 1 X 3

## (undated) DEVICE — PREP TRAY, SKIN, DRY, W/GLOVES

## (undated) DEVICE — TRAY, SURESTEP, URINE METER, 16FR, COMPLETE, W/STATLOCK

## (undated) DEVICE — GLOVE, PROTEXIS PI CLASSIC, SZ-7.0, PF, LF

## (undated) DEVICE — STAPLER, ABSORBABLE SORBAFIX 30

## (undated) DEVICE — Device

## (undated) DEVICE — CABLE, ELECTROSURGICAL, MONOPOLAR, LAPAROSCOPIC, 10 FT, LF

## (undated) DEVICE — SYRINGE, 50 CC, LUER LOCK

## (undated) DEVICE — NEEDLE, HYPODERMIC, 23 GA X 1.5 IN

## (undated) DEVICE — SUTURE, VICRYL, 4-0, 18 IN, PS2, UNDYED

## (undated) DEVICE — SCOPE WARMER, LAPAROSCOPE, BAG ONLY, LF

## (undated) DEVICE — TROCAR, KII OPTICAL BLADELESS 5MM Z THREAD 100MM LNGTH

## (undated) DEVICE — SUTURE, VICRYL, 0, 27 IN, UR-6, VIOLET

## (undated) DEVICE — SYRINGE, 10 CC, LUER LOCK

## (undated) DEVICE — TROCAR, OPTICAL, BLADELESS, 12MM, THREADED, 100MM LENGTH